# Patient Record
Sex: FEMALE | Race: OTHER
[De-identification: names, ages, dates, MRNs, and addresses within clinical notes are randomized per-mention and may not be internally consistent; named-entity substitution may affect disease eponyms.]

---

## 2020-05-26 LAB
ADD MANUAL DIFF: YES
ANION GAP SERPL CALC-SCNC: 7 MMOL/L (ref 5–15)
BUN SERPL-MCNC: 16 MG/DL (ref 7–18)
CALCIUM SERPL-MCNC: 9.5 MG/DL (ref 8.5–10.1)
CHLORIDE SERPL-SCNC: 103 MMOL/L (ref 98–107)
CO2 SERPL-SCNC: 30 MMOL/L (ref 21–32)
CREAT SERPL-MCNC: 1 MG/DL (ref 0.55–1.3)
ERYTHROCYTE [DISTWIDTH] IN BLOOD BY AUTOMATED COUNT: 11.3 % (ref 11.6–14.8)
HCT VFR BLD CALC: 42.4 % (ref 37–47)
HGB BLD-MCNC: 14.8 G/DL (ref 12–16)
MCV RBC AUTO: 87 FL (ref 80–99)
PLATELET # BLD: 322 K/UL (ref 150–450)
POTASSIUM SERPL-SCNC: 4 MMOL/L (ref 3.5–5.1)
RBC # BLD AUTO: 4.85 M/UL (ref 4.2–5.4)
SODIUM SERPL-SCNC: 140 MMOL/L (ref 136–145)
WBC # BLD AUTO: 7.2 K/UL (ref 4.8–10.8)

## 2020-05-27 NOTE — PRE-OP HX & PHY REPO 2 SIG
DATE OF ADMISSION:  05/28/2020

SCHEDULED FOR OUTPATIENT SURGERY:  05/28/2020.



HISTORY OF PRESENT ILLNESS:  The patient is a 69-year-old  female

in overall good health with a persistent right axillary cystic nodule.

She developed a nodule and then it became infected and quite large.  She

went to a local urgent care on 05/14/2020, where they performed incision

and drainage and started her on doxycycline orally.  She then had

significant improvement with decrease in size of the lump without any

inflammation.  She is scheduled to undergo excision of this right axillary

cyst.



PAST MEDICAL HISTORY/MEDICATIONS:  Metoprolol for hypertension and

levothyroxine.



ALLERGIES:  None.



OPERATIONS:  Years ago breast biopsy, excision of cyst of back, and thyroid

surgery.



PHYSICAL EXAMINATION:

GENERAL:  The patient is 5 feet 3 inches, 120 pounds.

HEENT:  Within normal limits.

LUNGS:  Clear.

HEART:  Regular rhythm.

BREASTS:  Without masses.  In the right axillary skin near the triceps

area, there is a 2 x 3 cm elevated firm nodule.  There is no axillary or

supraclavicular lymphadenopathy.  There is no sign of acute infection.



IMPRESSION:  Right axillary cyst with infection, now resolved.



PLAN:  Excision of right axillary cyst.  I have had a full discussion with

the patient regarding the nature of her condition, the nature of the

surgery, indications, alternatives, options, and risks including bleeding,

infection, recurrence of the cyst, development of a new cyst, infection,

or scarring etc.  All questions have been answered.  She understands and

agrees to proceed.









  ______________________________________________

  Danny Thomas M.D.





DR:  FINA

D:  05/27/2020 11:00

T:  05/27/2020 15:20

JOB#:  [id number]

CC:

## 2020-05-28 ENCOUNTER — HOSPITAL ENCOUNTER (OUTPATIENT)
Dept: HOSPITAL 72 - SUR | Age: 70
Discharge: HOME | End: 2020-05-28
Payer: MEDICARE

## 2020-05-28 VITALS — HEIGHT: 63 IN | BODY MASS INDEX: 20.38 KG/M2 | WEIGHT: 115 LBS

## 2020-05-28 VITALS — SYSTOLIC BLOOD PRESSURE: 108 MMHG | DIASTOLIC BLOOD PRESSURE: 66 MMHG

## 2020-05-28 VITALS — DIASTOLIC BLOOD PRESSURE: 69 MMHG | SYSTOLIC BLOOD PRESSURE: 111 MMHG

## 2020-05-28 VITALS — DIASTOLIC BLOOD PRESSURE: 67 MMHG | SYSTOLIC BLOOD PRESSURE: 107 MMHG

## 2020-05-28 VITALS — SYSTOLIC BLOOD PRESSURE: 109 MMHG | DIASTOLIC BLOOD PRESSURE: 70 MMHG

## 2020-05-28 VITALS — DIASTOLIC BLOOD PRESSURE: 83 MMHG | SYSTOLIC BLOOD PRESSURE: 140 MMHG

## 2020-05-28 VITALS — DIASTOLIC BLOOD PRESSURE: 66 MMHG | SYSTOLIC BLOOD PRESSURE: 107 MMHG

## 2020-05-28 VITALS — DIASTOLIC BLOOD PRESSURE: 64 MMHG | SYSTOLIC BLOOD PRESSURE: 104 MMHG

## 2020-05-28 VITALS — SYSTOLIC BLOOD PRESSURE: 115 MMHG | DIASTOLIC BLOOD PRESSURE: 72 MMHG

## 2020-05-28 VITALS — DIASTOLIC BLOOD PRESSURE: 72 MMHG | SYSTOLIC BLOOD PRESSURE: 119 MMHG

## 2020-05-28 VITALS — SYSTOLIC BLOOD PRESSURE: 115 MMHG | DIASTOLIC BLOOD PRESSURE: 71 MMHG

## 2020-05-28 VITALS — DIASTOLIC BLOOD PRESSURE: 69 MMHG | SYSTOLIC BLOOD PRESSURE: 125 MMHG

## 2020-05-28 VITALS — DIASTOLIC BLOOD PRESSURE: 68 MMHG | SYSTOLIC BLOOD PRESSURE: 104 MMHG

## 2020-05-28 VITALS — SYSTOLIC BLOOD PRESSURE: 125 MMHG | DIASTOLIC BLOOD PRESSURE: 78 MMHG

## 2020-05-28 VITALS — DIASTOLIC BLOOD PRESSURE: 70 MMHG | SYSTOLIC BLOOD PRESSURE: 115 MMHG

## 2020-05-28 DIAGNOSIS — E03.9: ICD-10-CM

## 2020-05-28 DIAGNOSIS — I10: ICD-10-CM

## 2020-05-28 DIAGNOSIS — Z79.899: ICD-10-CM

## 2020-05-28 DIAGNOSIS — E89.0: ICD-10-CM

## 2020-05-28 DIAGNOSIS — F41.9: ICD-10-CM

## 2020-05-28 DIAGNOSIS — K21.9: ICD-10-CM

## 2020-05-28 DIAGNOSIS — F32.9: ICD-10-CM

## 2020-05-28 DIAGNOSIS — B99.9: ICD-10-CM

## 2020-05-28 DIAGNOSIS — L72.0: Primary | ICD-10-CM

## 2020-05-28 PROCEDURE — 94150 VITAL CAPACITY TEST: CPT

## 2020-05-28 PROCEDURE — 93005 ELECTROCARDIOGRAM TRACING: CPT

## 2020-05-28 PROCEDURE — 85007 BL SMEAR W/DIFF WBC COUNT: CPT

## 2020-05-28 PROCEDURE — 11404 EXC TR-EXT B9+MARG 3.1-4 CM: CPT

## 2020-05-28 PROCEDURE — 85025 COMPLETE CBC W/AUTO DIFF WBC: CPT

## 2020-05-28 PROCEDURE — 94003 VENT MGMT INPAT SUBQ DAY: CPT

## 2020-05-28 PROCEDURE — 36415 COLL VENOUS BLD VENIPUNCTURE: CPT

## 2020-05-28 PROCEDURE — 12032 INTMD RPR S/A/T/EXT 2.6-7.5: CPT

## 2020-05-28 PROCEDURE — 80048 BASIC METABOLIC PNL TOTAL CA: CPT

## 2020-05-28 NOTE — ANETHESIA PREOPERATIVE EVAL
Anesthesia Pre-op PMH/ROS


General


Date of Evaluation:  May 28, 2020


Time of Evaluation:  10:45


Anesthesiologist:  Patrick


ASA Score:  ASA 2


Mallampati Score


Class I : Soft palate, uvula, fauces, pillars visible


Class II: Soft palate, uvula, fauces visible


Class III: Soft palate, base of uvula visible


Class IV: Only hard plate visible


Mallampati Classification:  Class II


Surgeon:  William


Diagnosis:  R axillary mass


Surgical Procedure:  Excision of R axillary mass


Anesthesia History:  none


Family History:  no anesthesia problems


Allergies:  


Coded Allergies:  


     No Known Allergies (Unverified , 20)


Medications:  see eMAR


Patient NPO?:  Yes





Past Medical History


Cardiovascular:  Reports: HTN - mild; 


   Denies: CAD, MI, valve dz, arrhythmia, other


Pulmonary:  Denies: asthma, COPD, KIKI, other


Gastrointestinal/Genitourinary:  Reports: GERD - mild; 


   Denies: CRI, ESRD, other


Neurologic/Psychiatric:  Reports: depression/anxiety; 


   Denies: dementia, CVA, TIA, other


Endocrine:  Reports: hypothyroidism - s/p partial thyroidectomy for goiter


HEENT:  Denies: cataract (L), cataract (R), glaucoma, [place] (L), [place] (R), other


Hematology/Immune:  Denies: anemia, DVT, bleeding disorder, other


Musculoskeletal/Integumentary:  Denies: OA, RA, DJD, DDD, edema, other


PMH Narrative:


as above


PSxH Narrative:


Thyroidectomy, breast Sx, 





Anesthesia Pre-op Phys. Exam


Physician Exam





Last Vital Signs








  Date Time  Temp Pulse Resp B/P (MAP) Pulse Ox O2 Delivery O2 Flow Rate FiO2


 


20 09:45      Room Air  


 


20 09:20 97.0 77 18 140/83 100   








Constitutional:  NAD


Neurologic:  CN 2-12 intact


Cardiovascular:  RRR, no M/R/G


Respiratory:  CTA


Gastrointestinal:  S/NT/ND





Airway Exam


Mallampati Score:  Class II


MO:  full


Neck:  flexible


ROM:  full


Teeth:  intact


Dentures:  no upper, no lower





Anesthesia Pre-op A/P


Labs


see chart





Studies


Pre-op Studies:  EKG - NSR





Risk Assessment & Plan


Assessment:


ASA 2


Plan:


MAC


Status Change Before Surgery:  No





Pre-Antibiotics


Drug:  Ancef 1gr.


Given Within 1 Hr of Incision:  Yes


Time Given:  11:10











Sekou Nguyen MD May 28, 2020 10:49

## 2020-05-28 NOTE — OPERATIVE NOTE - DICTATED
DATE OF OPERATION:  05/28/2020

SURGEON:  Danny Thomas M.D.



ASSISTANT:  None.



ANESTHESIOLOGIST:  Sekou Nguyen M.D.



TYPE OF ANESTHESIA:  Local IV sedation, monitored anesthesia care.



PREOPERATIVE DIAGNOSIS:  Right axillary cyst.



POSTOPERATIVE DIAGNOSIS:  Infected right axillary cyst.



OPERATION PERFORMED:  Excision of infected right axillary cyst.



DESCRIPTION OF PROCEDURE:  The patient was taken to the operating room and

with intravenous sedation was prepped and draped in usual fashion.  The

lesion was located in the skin of the lower axilla.  A 1% lidocaine plain

was used for local infiltration.  An elliptical incision was made and the

infected cyst excised.  The tissues were inflamed around it and hemostasis

carefully achieved with cautery.  Incision was closed with interrupted 4-0

Vicryl subcutaneous sutures followed by 4-0 nylon interrupted vertical

mattress sutures.  Tincture of benzoin and half-inch Steri-Strips were

applied followed by dry sterile dressing.  Final sponge and needle counts

were correct.  Specimen was given for pathology.  The patient tolerated

the procedure well, left the operating room in good condition.









  ______________________________________________

  Danny Thomas M.D.





DR:  Diana

D:  05/28/2020 12:00

T:  05/28/2020 18:07

JOB#:  [id number]

CC:

## 2020-05-28 NOTE — BRIEF OPERATIVE NOTE
Immediate Post Operative Note


Operative Note


Pre-op Diagnosis:


right axillary cyst


Procedure:


excision infected right axillary cyst


Post-op Diagnosis:


infected right axillary cyst


Post-op Diagnosis:  same as pre-op


Findings:  consistent w/pre-op dx studies


Surgeon:  boogie


Anesthesiologist:  tammy


Anesthesia:  MAC


Specimen:  yes - right axiallry cyst


Complications:  none


Condition:  stable


Fluids:  see anesthesia record


Estimated Blood Loss:  minimal


Drains:  none


Implant(s) used?:  No











Danny Thomas MD May 28, 2020 11:48

## 2020-05-28 NOTE — PRE-PROCEDURE NOTE/ATTESTATION
Pre-Procedure Note/Attestation


Complete Prior to Procedure


Planned Procedure:  right


Procedure Narrative:


excision of right axillary cyst





Indications for Procedure


Pre-Operative Diagnosis:


right axillary cyst





Attestation


I attest that I discussed the nature of the procedure; its benefits; risks and 

complications; and alternatives (and the risks and benefits of such alternatives

), prior to the procedure, with the patient (or the patient's legal 

representative).





I attest that, if there was a reasonable possibility of needing a blood 

transfusion, the patient (or the patient's legal representative) was given the 

[facility] standardized written summary, pursuant 

to the Larry Veronika Blood Safety Act ([place] Health and Safety Code # 1645, as 

amended).





I attest that I re-evaluated the patient just prior to the surgery and that 

there has been no change in the patient's H&P, except as documented below: none











Danny Thomas MD May 28, 2020 09:52

## 2020-05-28 NOTE — IMMEDIATE POST-OP EVALUATION
Immediate Post-Op Evalulation


Immediate Post-Op Evalulation


Procedure:  Excision of R axillary mass


Date of Evaluation:  May 28, 2020


Time of Evaluation:  11:50


IV Fluids:  600


Blood Products:  none


Estimated Blood Loss:  min


Urinary Output:  none


Blood Pressure Systolic:  125


Blood Pressure Diastolic:  69


Pulse Rate:  62


Respiratory Rate:  18


O2 Sat by Pulse Oximetry:  99


Temperature (Fahrenheit):  97.6


Pain Score (1-10):  1


Nausea:  No


Vomiting:  No


Complications


none


Patient Status:  awake, patent, none











Sekou Nguyen MD May 28, 2020 11:51

## 2020-05-28 NOTE — 48 HOUR POST ANESTHESIA EVAL
Post Anesthesia Evaluation


Procedure:  Excision of R axillary mass


Date of Evaluation:  May 28, 2020


Time of Evaluation:  12:26


Blood Pressure Systolic:  116


0:  64


Pulse Rate:  62


Respiratory Rate:  18


Temperature (Fahrenheit):  97.6


O2 Sat by Pulse Oximetry:  99


Airway:  patent


Nausea:  No


Vomiting:  No


Pain Intensity:  2


Hydration Status:  adequate


Cardiopulmonary Status:


stable


Mental Status/LOC:  patient returned to baseline


Follow-up Care/Observations:


n/a


Post-Anesthesia Complications:


none


Follow-up care needed:  ready to discharge











Sekou Nguyen MD May 28, 2020 12:27